# Patient Record
Sex: FEMALE | Race: WHITE
[De-identification: names, ages, dates, MRNs, and addresses within clinical notes are randomized per-mention and may not be internally consistent; named-entity substitution may affect disease eponyms.]

---

## 2019-03-12 ENCOUNTER — HOSPITAL ENCOUNTER (INPATIENT)
Dept: HOSPITAL 35 - ER | Age: 39
LOS: 7 days | Discharge: HOME | DRG: 392 | End: 2019-03-19
Attending: HOSPITALIST | Admitting: HOSPITALIST
Payer: COMMERCIAL

## 2019-03-12 VITALS — HEIGHT: 72 IN | WEIGHT: 220 LBS | BODY MASS INDEX: 29.8 KG/M2

## 2019-03-12 VITALS — SYSTOLIC BLOOD PRESSURE: 106 MMHG | DIASTOLIC BLOOD PRESSURE: 79 MMHG

## 2019-03-12 DIAGNOSIS — K20.9: ICD-10-CM

## 2019-03-12 DIAGNOSIS — F43.10: ICD-10-CM

## 2019-03-12 DIAGNOSIS — G89.4: ICD-10-CM
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
## 2019-03-12 DIAGNOSIS — G90.50: ICD-10-CM

## 2019-03-12 DIAGNOSIS — K29.80: ICD-10-CM

## 2019-03-12 DIAGNOSIS — K56.7: ICD-10-CM

## 2019-03-12 DIAGNOSIS — T40.2X5A: ICD-10-CM

## 2019-03-12 DIAGNOSIS — Z79.899: ICD-10-CM

## 2019-03-12 DIAGNOSIS — K59.03: ICD-10-CM

## 2019-03-12 DIAGNOSIS — K29.60: Primary | ICD-10-CM

## 2019-03-12 DIAGNOSIS — F41.9: ICD-10-CM

## 2019-03-12 LAB
ALBUMIN SERPL-MCNC: 4.4 G/DL (ref 3.4–5)
ALT SERPL-CCNC: 13 U/L (ref 30–65)
ANION GAP SERPL CALC-SCNC: 13 MMOL/L (ref 7–16)
AST SERPL-CCNC: 15 U/L (ref 15–37)
BASOPHILS NFR BLD AUTO: 0.3 % (ref 0–2)
BILIRUB SERPL-MCNC: 0.4 MG/DL
BUN SERPL-MCNC: 10 MG/DL (ref 7–18)
CALCIUM SERPL-MCNC: 9.3 MG/DL (ref 8.5–10.1)
CHLORIDE SERPL-SCNC: 101 MMOL/L (ref 98–107)
CO2 SERPL-SCNC: 22 MMOL/L (ref 21–32)
CREAT SERPL-MCNC: 0.7 MG/DL (ref 0.6–1)
EOSINOPHIL NFR BLD: 0 % (ref 0–3)
ERYTHROCYTE [DISTWIDTH] IN BLOOD BY AUTOMATED COUNT: 16.9 % (ref 10.5–14.5)
GLUCOSE SERPL-MCNC: 104 MG/DL (ref 74–106)
GRANULOCYTES NFR BLD MANUAL: 86.1 % (ref 36–66)
HCT VFR BLD CALC: 38.7 % (ref 37–47)
HGB BLD-MCNC: 12.8 GM/DL (ref 12–15)
LIPASE: 75 U/L (ref 73–393)
LYMPHOCYTES NFR BLD AUTO: 11.8 % (ref 24–44)
MCH RBC QN AUTO: 27.6 PG (ref 26–34)
MCHC RBC AUTO-ENTMCNC: 33.1 G/DL (ref 28–37)
MCV RBC: 83.3 FL (ref 80–100)
MONOCYTES NFR BLD: 1.8 % (ref 1–8)
NEUTROPHILS # BLD: 8.3 THOU/UL (ref 1.4–8.2)
PLATELET # BLD: 342 THOU/UL (ref 150–400)
POTASSIUM SERPL-SCNC: 3.6 MMOL/L (ref 3.5–5.1)
PROT SERPL-MCNC: 8.7 G/DL (ref 6.4–8.2)
RBC # BLD AUTO: 4.65 MIL/UL (ref 4.2–5)
SODIUM SERPL-SCNC: 136 MMOL/L (ref 136–145)
WBC # BLD AUTO: 9.6 THOU/UL (ref 4–11)

## 2019-03-12 PROCEDURE — 62900: CPT

## 2019-03-12 PROCEDURE — 62110: CPT

## 2019-03-12 PROCEDURE — 10783: CPT

## 2019-03-13 VITALS — SYSTOLIC BLOOD PRESSURE: 124 MMHG | DIASTOLIC BLOOD PRESSURE: 65 MMHG

## 2019-03-13 VITALS — DIASTOLIC BLOOD PRESSURE: 66 MMHG | SYSTOLIC BLOOD PRESSURE: 102 MMHG

## 2019-03-13 VITALS — DIASTOLIC BLOOD PRESSURE: 75 MMHG | SYSTOLIC BLOOD PRESSURE: 119 MMHG

## 2019-03-13 VITALS — SYSTOLIC BLOOD PRESSURE: 129 MMHG | DIASTOLIC BLOOD PRESSURE: 66 MMHG

## 2019-03-13 VITALS — DIASTOLIC BLOOD PRESSURE: 65 MMHG | SYSTOLIC BLOOD PRESSURE: 117 MMHG

## 2019-03-13 LAB
ANION GAP SERPL CALC-SCNC: 13 MMOL/L (ref 7–16)
BACTERIA-REFLEX: (no result) /HPF
BILIRUB UR-MCNC: NEGATIVE MG/DL
BUN SERPL-MCNC: 8 MG/DL (ref 7–18)
CALCIUM SERPL-MCNC: 8.4 MG/DL (ref 8.5–10.1)
CHLORIDE SERPL-SCNC: 105 MMOL/L (ref 98–107)
CO2 SERPL-SCNC: 20 MMOL/L (ref 21–32)
COLOR UR: YELLOW
CREAT SERPL-MCNC: 0.7 MG/DL (ref 0.6–1)
GLUCOSE SERPL-MCNC: 95 MG/DL (ref 74–106)
KETONES UR STRIP-MCNC: (no result) MG/DL
MUCUS: (no result) STRN/LPF
POTASSIUM SERPL-SCNC: 3.4 MMOL/L (ref 3.5–5.1)
RBC # UR STRIP: (no result) /UL
RBC #/AREA URNS HPF: (no result) /HPF (ref 0–2)
SODIUM SERPL-SCNC: 138 MMOL/L (ref 136–145)
SP GR UR STRIP: 1.01 (ref 1–1.03)
SQUAMOUS: (no result) /LPF (ref 0–3)
URINE CLARITY: CLEAR
URINE GLUCOSE-RANDOM*: NEGATIVE
URINE LEUKOCYTES-REFLEX: NEGATIVE
URINE NITRITE-REFLEX: NEGATIVE
URINE PROTEIN (DIPSTICK): NEGATIVE
URINE WBC-REFLEX: (no result) /HPF (ref 0–5)
UROBILINOGEN UR STRIP-ACNC: 0.2 E.U./DL (ref 0.2–1)

## 2019-03-13 NOTE — NUR
ASSESMENT COMPLETED. VSS. A/O. C/O PAIN AND NAUSEA MANAGED BY MEDS AS ORDERED.
NO NOTED SOA. UP AD HERMINIO. PT RESTING IN BED AT THIS TIME. FAMILY AT BEDSIDE.
WILL CONT. TO MONITOR.

## 2019-03-13 NOTE — NUR
PT ARRIVED TO UNIT APPROX 0300, ABLE TO WALK FROM W/C TO BED. ADMISSION AND
ASSESSMENT COMPLETED, CONSENTS SIGNED. PT C/O MID STERNAL CHEST PAIN THAT
RADIATES OUTWARD AND INTO THE BACK, IS WORSE AFTER VOMITING, ALSO HAS SOME SOB
WITH THE PAIN AND NAUSEA. REPORTS HER PAIN IS TOLERABLE AT A 7/10, BUT HAS
BEEN UP TO A 9/10 TONIGHT. REPORTS THE NAUSEA IS WORSE THAN THE PAIN, AND
CONTINUES TO DRY HEAVE; HAVE GIVEN ZOFRAN AND COMPAZINE OVERNIGHT. PT'S SPOUSE
AND SERVICE DOG ARE IN THE ROOM WITH HER; PAPERS ON THE SERVICE DOG ARE
LOCATED ON THE BACK OF THE CHART AND A SIGN IS POSTED ON THE DOOR OF THE ROOM.
IV FLUIDS INFUSING OVERNIGHT. NO OTHER CONCERNS, SHIFT REPORT GIVEN AT 0700.

## 2019-03-13 NOTE — NUR
INITIAL ASSESSMENT:
Pt evaluated for d/c planning needs.  Reviewed chart and spoke with nurse and
pt.  Pt is alert and oriented.  Pt lives at home with wife and was independent
with ADL's prior to admission to the hospital.  Pt has cane at home and has
not had home health in the past.  Pt plans on returning home on d/c from
hospital.  Will remain available to assist as needed.

## 2019-03-13 NOTE — NUR
Pt newly admitted this am for intractable n/v.  High nutrition risk trigger
for unintentional wt loss 14-23 lb, decreased oral intake with constipation.
On IVF, GI consult pending.  NPO status.  Will follow up in few days for
timely diet advance, tolerance.

## 2019-03-14 VITALS — DIASTOLIC BLOOD PRESSURE: 86 MMHG | SYSTOLIC BLOOD PRESSURE: 127 MMHG

## 2019-03-14 VITALS — DIASTOLIC BLOOD PRESSURE: 78 MMHG | SYSTOLIC BLOOD PRESSURE: 149 MMHG

## 2019-03-14 VITALS — SYSTOLIC BLOOD PRESSURE: 135 MMHG | DIASTOLIC BLOOD PRESSURE: 81 MMHG

## 2019-03-14 VITALS — SYSTOLIC BLOOD PRESSURE: 157 MMHG | DIASTOLIC BLOOD PRESSURE: 69 MMHG

## 2019-03-14 LAB
ANION GAP SERPL CALC-SCNC: 11 MMOL/L (ref 7–16)
BUN SERPL-MCNC: 5 MG/DL (ref 7–18)
CALCIUM SERPL-MCNC: 8 MG/DL (ref 8.5–10.1)
CHLORIDE SERPL-SCNC: 106 MMOL/L (ref 98–107)
CO2 SERPL-SCNC: 21 MMOL/L (ref 21–32)
CREAT SERPL-MCNC: 0.6 MG/DL (ref 0.6–1)
ERYTHROCYTE [DISTWIDTH] IN BLOOD BY AUTOMATED COUNT: 16.7 % (ref 10.5–14.5)
GLUCOSE SERPL-MCNC: 103 MG/DL (ref 74–106)
HCT VFR BLD CALC: 30.1 % (ref 37–47)
HGB BLD-MCNC: 10 GM/DL (ref 12–15)
MCH RBC QN AUTO: 28.2 PG (ref 26–34)
MCHC RBC AUTO-ENTMCNC: 33.2 G/DL (ref 28–37)
MCV RBC: 84.9 FL (ref 80–100)
PLATELET # BLD: 239 THOU/UL (ref 150–400)
POTASSIUM SERPL-SCNC: 2.7 MMOL/L (ref 3.5–5.1)
RBC # BLD AUTO: 3.54 MIL/UL (ref 4.2–5)
SODIUM SERPL-SCNC: 138 MMOL/L (ref 136–145)
WBC # BLD AUTO: 6.2 THOU/UL (ref 4–11)

## 2019-03-14 NOTE — NUR
RECEIVED BACK FROM [facility] APPROX 1045. TEARFUL. PAIN MEDS GIVEN AS ORDERED.
HEATING PAD ORDERED. NOTED PT WATCHING TV ABLE TO CONVERSE AND LAUGH BUT STILL
EXPRESSES SIGNIFICANT PAIN. NEW IV STARTED IN LEFT FOREARM AS RIGHT AC IV
IS UNCOMFORTABLE. PT REQUESTING TO NAP FOR ATLEAST A COUPLE HOURS. WILL CONT.
TO MONITOR.

## 2019-03-14 NOTE — NUR
ASSUMED CARE AT 1900, ASSESSMENT COMPLETED. PT MILDLY ANXIOUS WITH MODERATE
NAUSEA BUT DENIES NEEDING NAUSEA MEDS OVERNIGHT. PRIMARY COMPLAINT IS PAIN IN
HER RIGHT FOOT, STERNAL CHEST, AND IN HER RIGHT ARM; GAVE SCHEDULED OXY AND
PRN CLONAZAPAM AND ATIVAN OVERNIGHT. DISCUSSED PLANS FOR PIPPIDA AND ABD US
TODAY, THAT SHE WOULD BE NPO AFTER MIDNIGHT AND UNABLE TO TAKE ANY NARCOTIC
MEDS--PT STATES SHE UNDERSTANDS. PT TOOK SHOWER AT HS. REPORTS SLEEPING
POORLY, WAS FRUSTRATED AT BEING WOKEN UP FOR LABS THIS AM; AFTERWORD TALKED
WITH PT AND AGREED TO LABS BE HELD UNTIL AROUND 0800. ALSO PLACED SIGN ON DOOR
ASKING STAFF TO CHECK IN WITH THE NURSE BEFORE ENTERING THE ROOM IN CASE PT IS
TRYING TO REST. PT'S WIFE AND SERVICE DOG PRESENT IN ROOM. NO OTHER CONCERNS,
WILL CONTINUE TO MONITOR.

## 2019-03-14 NOTE — NUR
ASSESMENT COMPLETED. VSS. A/O/VERY ANXIOUS. C/O PAIN. NO NOTED NV. NO NOTED
TAE. UP AD HERMINIO. WIFE AND DOG AT BEDSIDE. WANTING TO GET TEST DONE THIS AM. PT
DOWN IN NUC MED AT THIS TIME.

## 2019-03-15 VITALS — SYSTOLIC BLOOD PRESSURE: 121 MMHG | DIASTOLIC BLOOD PRESSURE: 67 MMHG

## 2019-03-15 VITALS — SYSTOLIC BLOOD PRESSURE: 120 MMHG | DIASTOLIC BLOOD PRESSURE: 85 MMHG

## 2019-03-15 VITALS — SYSTOLIC BLOOD PRESSURE: 148 MMHG | DIASTOLIC BLOOD PRESSURE: 98 MMHG

## 2019-03-15 VITALS — DIASTOLIC BLOOD PRESSURE: 76 MMHG | SYSTOLIC BLOOD PRESSURE: 136 MMHG

## 2019-03-15 NOTE — NUR
ASSESSMENT COMPLETED. PT IS UP AD HERMINIO. STILL C/O LUQ. NO NAUSEA THIS SHIFT.
CHRONIC R FOOT PAIN, LIDOCAINE APPLIED WITH RELIEF. AFEBRILE. PROBLEM SLEEPING
TONIGHT. GIVEN SLEEP AIDE WITH POOR RESULTS, BENADRYL JUST GIVEN PER PT
REQUEST-HOPEFULLY IT WILL HELP.SERVICE DOG AND SPOUSE BY BEDSIDE. SOME ELEMENT
OF ANXIETY BUT SHE HAS BEEN SMILING AND VERY COOPERATIVE.

## 2019-03-16 VITALS — DIASTOLIC BLOOD PRESSURE: 116 MMHG | SYSTOLIC BLOOD PRESSURE: 153 MMHG

## 2019-03-16 VITALS — DIASTOLIC BLOOD PRESSURE: 98 MMHG | SYSTOLIC BLOOD PRESSURE: 148 MMHG

## 2019-03-16 LAB
ALBUMIN SERPL-MCNC: 3.1 G/DL (ref 3.4–5)
ALT SERPL-CCNC: 17 U/L (ref 30–65)
ANION GAP SERPL CALC-SCNC: 9 MMOL/L (ref 7–16)
AST SERPL-CCNC: 17 U/L (ref 15–37)
BILIRUB SERPL-MCNC: 0.4 MG/DL
BUN SERPL-MCNC: 2 MG/DL (ref 7–18)
CALCIUM SERPL-MCNC: 7.8 MG/DL (ref 8.5–10.1)
CHLORIDE SERPL-SCNC: 107 MMOL/L (ref 98–107)
CO2 SERPL-SCNC: 23 MMOL/L (ref 21–32)
CREAT SERPL-MCNC: 0.6 MG/DL (ref 0.6–1)
ERYTHROCYTE [DISTWIDTH] IN BLOOD BY AUTOMATED COUNT: 16.8 % (ref 10.5–14.5)
GLUCOSE SERPL-MCNC: 83 MG/DL (ref 74–106)
HCT VFR BLD CALC: 30.4 % (ref 37–47)
HGB BLD-MCNC: 10 GM/DL (ref 12–15)
LIPASE: 66 U/L (ref 73–393)
MCH RBC QN AUTO: 27.7 PG (ref 26–34)
MCHC RBC AUTO-ENTMCNC: 32.8 G/DL (ref 28–37)
MCV RBC: 84.6 FL (ref 80–100)
PLATELET # BLD: 237 THOU/UL (ref 150–400)
POTASSIUM SERPL-SCNC: 2.8 MMOL/L (ref 3.5–5.1)
PROT SERPL-MCNC: 6 G/DL (ref 6.4–8.2)
RBC # BLD AUTO: 3.59 MIL/UL (ref 4.2–5)
SODIUM SERPL-SCNC: 139 MMOL/L (ref 136–145)
WBC # BLD AUTO: 4.3 THOU/UL (ref 4–11)

## 2019-03-16 NOTE — NUR
ASSUMED PT CARE AT AROUND 1915 HRS.PT FRUSTRATED AND COMPLAINING ABOUT FEELING
WORSE. PT WAS C/O THAT MIRALAX GIVEN IN THE DAY MADE HER FEEL WORSE. PT
REPORTS EMESIS. ORDERS RECEIVED FOR ONE TIME TORADOL AND COMPAZINE WAS ALSO
GIVEN. PT NOT HAPPY ABOUT THE TORADOL ORDER. FELT LIKE IT WAS NOT GOING TO
HELP HER. PT C/O ABOUT THE  , THE DAY SHIFT RN, DIETARY ETC.
THERAPEUTIC EAR PROVIDED. PT WAS AT ONE TIME PACKED UP AND READY TO LEAVE AMA.
THIS NURSE REMINDED HER THAT LEAVING AMA MIGHT INTERFERE WITH INSURANCE PAYING
FOR HER CARE-PT DECIDED TO STAY. PT IS EXTREMELY ANXIOUS. ANXIETY AND PAIN
MEDS GIVEN PER SCHEDULE. PT IS AFEBRILE.
WILL CONTINUE WITH POC TILL EOS.

## 2019-03-16 NOTE — NUR
DR. GUERIN DISCONTINUED STANDING DISCHARGE ORDERS BY DR. VILLANUEVA DUE TO PATIENT'S
LOW POTASSIUM AND UNRELIEVED SYMPTOMS OF N/V. NEW IV FLUID ORDERS. WILL
CONTINUE POTASSIUM PROTOCOL. NEW NAUSEA MED ORDER, WILL GIVE AS ORDERED. DR. GUERIN SPOKE BY TELEPHONE TO THE PATIENT. PATIENT WILL BE STAYING THE WEEKEND
AND PLAN FOR FURTHER TESTING ON MONDAY.

## 2019-03-16 NOTE — NUR
PT EXPRESSES CONCERNS ABOUT HOSPITAL STAY AND NOT READY TO DISCHARGE. PT
REQUESTED TO SEE DR. FRASER AND DR. LEMUS. PHYSICIANS NOTIFIED. PHYSICIANS
UNABLE TO SEE PT IN PERSON FOR NON-EMERGENT CASES TODAY. BOTH PHYSICIANS STATE
SHE IS OKAY TO GO HOME FROM [facility]. PT IS UPSET ABOUT THE SITUATION.

## 2019-03-16 NOTE — NUR
HOSPITALIST CHANGED FROM DR. VILLANUEVA TO DR. GUERIN. DR. GUERIN TO SEE PATIENT AND
SIGNIFICANT OTHER AT BEDSIDE. PATIENT AND S.O. DISAGREED WITH DR. GUERIN'S
SECOND OPINION.

## 2019-03-17 VITALS — SYSTOLIC BLOOD PRESSURE: 153 MMHG | DIASTOLIC BLOOD PRESSURE: 97 MMHG

## 2019-03-17 VITALS — DIASTOLIC BLOOD PRESSURE: 80 MMHG | SYSTOLIC BLOOD PRESSURE: 138 MMHG

## 2019-03-17 VITALS — DIASTOLIC BLOOD PRESSURE: 75 MMHG | SYSTOLIC BLOOD PRESSURE: 128 MMHG

## 2019-03-17 LAB
MAGNESIUM SERPL-MCNC: 2.1 MG/DL (ref 1.8–2.4)
POTASSIUM SERPL-SCNC: 3.2 MMOL/L (ref 3.5–5.1)

## 2019-03-17 NOTE — NUR
PT AMBULATING IN ROOM INDEPENDENTLY AND IS TOLERATING WELL.  OXY IR PROVIDING
PAIN RELIEF.  COMPAZINE PROVIDING NAUSEA RELIEF.  RESTING COMFORTABLY.  NO
NEEDS VOICED.  CALL LIGHT WITHIN REACH.  WILL CONTINUE TO PROVIDE FREQUENT
OBSERVATION.

## 2019-03-17 NOTE — NUR
SUPPOSITORY GIVEN AS ORDERED. PT VERY ANXIOUS. PT TRYING TO HAVE BM AT THIS
TIME. WILL CONT. TO MONITOR.

## 2019-03-17 NOTE — NUR
ASSESMENT COMPLETED. VSS. C/O PAIN AND NAUSEA. MEDS GIVEN AS ORDERED. SPENT
ATLEAST 40 MINUTES DISCUSSING POC WITH PATIENT THIS AM. PT VERY
ANXIOUS/TEARFUL. HAD COMPLAINS ABOUT HOSPITAL STAFF. REASSURANCE GIVEN. PT
RESTING IN BED AT THIS TIME. WIFE AT BEDSIDE. WILL CONT. TO MONITOR.

## 2019-03-17 NOTE — NUR
PT C/O SEVERE PAIN/CRAMPING AFTER ENEMA AND SUPPOSITORY- NO RESULTS. REQUESTED
ENEMA AT TIME WHEN NEXT PAIN AND ANXIETY MED DUE.

## 2019-03-18 VITALS — DIASTOLIC BLOOD PRESSURE: 61 MMHG | SYSTOLIC BLOOD PRESSURE: 127 MMHG

## 2019-03-18 VITALS — DIASTOLIC BLOOD PRESSURE: 92 MMHG | SYSTOLIC BLOOD PRESSURE: 143 MMHG

## 2019-03-18 VITALS — DIASTOLIC BLOOD PRESSURE: 83 MMHG | SYSTOLIC BLOOD PRESSURE: 123 MMHG

## 2019-03-18 VITALS — SYSTOLIC BLOOD PRESSURE: 106 MMHG | DIASTOLIC BLOOD PRESSURE: 66 MMHG

## 2019-03-18 LAB
ANION GAP SERPL CALC-SCNC: 14 MMOL/L (ref 7–16)
BUN SERPL-MCNC: 3 MG/DL (ref 7–18)
CALCIUM SERPL-MCNC: 8.7 MG/DL (ref 8.5–10.1)
CHLORIDE SERPL-SCNC: 105 MMOL/L (ref 98–107)
CO2 SERPL-SCNC: 21 MMOL/L (ref 21–32)
CREAT SERPL-MCNC: 0.6 MG/DL (ref 0.6–1)
ERYTHROCYTE [DISTWIDTH] IN BLOOD BY AUTOMATED COUNT: 17.3 % (ref 10.5–14.5)
GLUCOSE SERPL-MCNC: 83 MG/DL (ref 74–106)
HCT VFR BLD CALC: 34.1 % (ref 37–47)
HGB BLD-MCNC: 11.2 GM/DL (ref 12–15)
MAGNESIUM SERPL-MCNC: 2.1 MG/DL (ref 1.8–2.4)
MCH RBC QN AUTO: 28.1 PG (ref 26–34)
MCHC RBC AUTO-ENTMCNC: 32.9 G/DL (ref 28–37)
MCV RBC: 85.3 FL (ref 80–100)
PLATELET # BLD: 263 THOU/UL (ref 150–400)
POTASSIUM SERPL-SCNC: 3.5 MMOL/L (ref 3.5–5.1)
RBC # BLD AUTO: 3.99 MIL/UL (ref 4.2–5)
SODIUM SERPL-SCNC: 140 MMOL/L (ref 136–145)
WBC # BLD AUTO: 4.2 THOU/UL (ref 4–11)

## 2019-03-18 NOTE — NUR
ASSUMED CARE THIS AM, SHIFT ASSESSMENT DONE, MEDS GIVEN AS SCHEDUELD. PATIENET
REPORTED PAIN, SCHEDULED PAIN MEDS GIVEN. REPORTED NAUSEA, NAUSEA MEDS GIVEN,
SEE eMAR. DR GUERIN ORDERED BMP, CBC. LABS CAME BACK AND INIDCATED PATIENT
WAS NOT DEHYDRATED. SO DR GUERIN INDICATED TO DISCONTINUE IV FLUIDS. DR GUERIN
SAID PATIENT CAN BE DISCHARGED IF KUB IS CLEAN. KUB OF ABDOEMEN ORDERED, KUB
SHOWED RETAINED STOOL. DR GUERIN WAS NOTIFIED, ONE TIME DOSE OF MAGNESIUM
CITRATE WAS ORDERED. PATIENT ASKED FOR LORAZEPAM SO THAT SHE CAN TOLEARATE IT
WELL. DR GUERIN NOTIFIED AND ONE TIME ORDER OF LORAZEPAM GIVEN. MAGNESIUM
CITRATE WAS GIVEN AT 1730. PATINET REPROTED NAUSEA AT 1800, PRN ZOFRAN GIVEN.
PATIENT THREW UP ONCE, 50 ML OF LIQUID. AWAITING FOR A BM. DR GUERIN INDICATED
IF PATIENT HAS A BM THEN SHE CAN BE DISCHARGED. WILL CONTINUE TO ASSES AND
ASSIST WITH ADLs AS NEEDED.

## 2019-03-18 NOTE — NUR
Nutrition follow-up: Pt continues to have nausea and abdominal pain. Is on
clear liquid diet, but is drinking only small amounts. Pt reports 30 lb wt
loss since November. She is interested in possible outpatient nutrition
education. Will follow for diet advancement. Possible d/c in the next few
days.

## 2019-03-18 NOTE — NUR
PATIENT ALSO REPORTED UNWITNESSED WHITE-YELLOW DISCHARGE. DR GUERIN AWARE, DR SALMERON WAS CONSULTED. DR SALMERON CALLED BACK AND INDICATED HE IS OUT OF TOWN WILL
SEE THE PATIENT TUESDAY.

## 2019-03-18 NOTE — NUR
VERY ANXIOUS, GIVEN PRN KLONOPIN, LOTS OF EMOTIONAL SUPPORT. SCHEDULED
OXYCODONE Q6HRS, DID REQUIRE X 1 IV TORADOL. INCREASED PAIN TONIGHT REPORTED
SINCE HAS INCREASED ABD DISTENTION. NO RESULTS FROM ENEMAS GIVEN. REFUSING
IVF, HAS AVERSION TO HAVING IVF INFUSING WITH ANY AMOUNT OF POTASSIUM IN IT.
AM POTASSIUM LEVEL BEING DRAWN WHILE WRITING THIS NOTE.

## 2019-03-19 VITALS — SYSTOLIC BLOOD PRESSURE: 148 MMHG | DIASTOLIC BLOOD PRESSURE: 98 MMHG

## 2019-03-19 VITALS — DIASTOLIC BLOOD PRESSURE: 72 MMHG | SYSTOLIC BLOOD PRESSURE: 115 MMHG

## 2019-03-19 NOTE — NUR
Assumed pt care at 7am.Assessment completed.vss.Pt c/o nausea and requested
for compazine. Pharmacy notified and med given upon arrival.Pt requested for
Dr christine to be paged asap and wanted to dc home this morning.Dr Christine rounded on
pt and dc order noted.Saline lock dc'd. Dc summary complied and reviewed with
pt and spouse.Consult to Dr Ye cancelled.Pt will be dc home before noon.
Will continue to monitor.